# Patient Record
Sex: FEMALE | Race: WHITE | NOT HISPANIC OR LATINO | Employment: UNEMPLOYED | URBAN - METROPOLITAN AREA
[De-identification: names, ages, dates, MRNs, and addresses within clinical notes are randomized per-mention and may not be internally consistent; named-entity substitution may affect disease eponyms.]

---

## 2022-06-04 ENCOUNTER — APPOINTMENT (OUTPATIENT)
Dept: ULTRASOUND IMAGING | Facility: CLINIC | Age: 37
End: 2022-06-04
Attending: EMERGENCY MEDICINE
Payer: COMMERCIAL

## 2022-06-04 ENCOUNTER — HOSPITAL ENCOUNTER (EMERGENCY)
Facility: CLINIC | Age: 37
Discharge: HOME | End: 2022-06-04
Attending: EMERGENCY MEDICINE | Admitting: EMERGENCY MEDICINE
Payer: COMMERCIAL

## 2022-06-04 VITALS
SYSTOLIC BLOOD PRESSURE: 142 MMHG | OXYGEN SATURATION: 100 % | TEMPERATURE: 98.7 F | DIASTOLIC BLOOD PRESSURE: 79 MMHG | BODY MASS INDEX: 30.41 KG/M2 | WEIGHT: 200 LBS | HEART RATE: 97 BPM | RESPIRATION RATE: 18 BRPM

## 2022-06-04 DIAGNOSIS — R11.2 NAUSEA AND VOMITING, INTRACTABILITY OF VOMITING NOT SPECIFIED, UNSPECIFIED VOMITING TYPE: ICD-10-CM

## 2022-06-04 DIAGNOSIS — D72.829 LEUKOCYTOSIS, UNSPECIFIED TYPE: ICD-10-CM

## 2022-06-04 DIAGNOSIS — R11.2 CANNABINOID HYPEREMESIS SYNDROME: ICD-10-CM

## 2022-06-04 DIAGNOSIS — N93.9 VAGINAL BLEEDING: ICD-10-CM

## 2022-06-04 DIAGNOSIS — F12.90 CANNABINOID HYPEREMESIS SYNDROME: ICD-10-CM

## 2022-06-04 LAB
ALBUMIN SERPL-MCNC: 4.4 G/DL (ref 3.5–5)
ALBUMIN UR-MCNC: 70 MG/DL
ALP SERPL-CCNC: 70 U/L (ref 45–120)
ALT SERPL W P-5'-P-CCNC: 11 U/L (ref 0–45)
ANION GAP SERPL CALCULATED.3IONS-SCNC: 12 MMOL/L (ref 5–18)
APPEARANCE UR: ABNORMAL
AST SERPL W P-5'-P-CCNC: 12 U/L (ref 0–40)
BACTERIA #/AREA URNS HPF: ABNORMAL /HPF
BASOPHILS # BLD MANUAL: 0 10E3/UL (ref 0–0.2)
BASOPHILS NFR BLD MANUAL: 0 %
BILIRUB SERPL-MCNC: 0.7 MG/DL (ref 0–1)
BILIRUB UR QL STRIP: NEGATIVE
BUN SERPL-MCNC: 11 MG/DL (ref 8–22)
C REACTIVE PROTEIN LHE: 0.2 MG/DL (ref 0–0.8)
CALCIUM SERPL-MCNC: 9.7 MG/DL (ref 8.5–10.5)
CHLORIDE BLD-SCNC: 109 MMOL/L (ref 98–107)
CO2 SERPL-SCNC: 21 MMOL/L (ref 22–31)
COLOR UR AUTO: YELLOW
CREAT SERPL-MCNC: 0.95 MG/DL (ref 0.6–1.1)
EOSINOPHIL # BLD MANUAL: 0 10E3/UL (ref 0–0.7)
EOSINOPHIL NFR BLD MANUAL: 0 %
ERYTHROCYTE [DISTWIDTH] IN BLOOD BY AUTOMATED COUNT: 17.7 % (ref 10–15)
GFR SERPL CREATININE-BSD FRML MDRD: 79 ML/MIN/1.73M2
GLUCOSE BLD-MCNC: 185 MG/DL (ref 70–125)
GLUCOSE UR STRIP-MCNC: NEGATIVE MG/DL
HCG SERPL QL: NEGATIVE
HCT VFR BLD AUTO: 34.9 % (ref 35–47)
HGB BLD-MCNC: 10.5 G/DL (ref 11.7–15.7)
HGB UR QL STRIP: ABNORMAL
KETONES UR STRIP-MCNC: 80 MG/DL
LEUKOCYTE ESTERASE UR QL STRIP: NEGATIVE
LYMPHOCYTES # BLD MANUAL: 0.6 10E3/UL (ref 0.8–5.3)
LYMPHOCYTES NFR BLD MANUAL: 3 %
MCH RBC QN AUTO: 22.7 PG (ref 26.5–33)
MCHC RBC AUTO-ENTMCNC: 30.1 G/DL (ref 31.5–36.5)
MCV RBC AUTO: 76 FL (ref 78–100)
MONOCYTES # BLD MANUAL: 0.6 10E3/UL (ref 0–1.3)
MONOCYTES NFR BLD MANUAL: 3 %
MUCOUS THREADS #/AREA URNS LPF: PRESENT /LPF
NEUTROPHILS # BLD MANUAL: 19 10E3/UL (ref 1.6–8.3)
NEUTROPHILS NFR BLD MANUAL: 94 %
NITRATE UR QL: NEGATIVE
PH UR STRIP: 6 [PH] (ref 5–7)
PLAT MORPH BLD: ABNORMAL
PLATELET # BLD AUTO: 433 10E3/UL (ref 150–450)
POTASSIUM BLD-SCNC: 4.4 MMOL/L (ref 3.5–5)
PROT SERPL-MCNC: 7.9 G/DL (ref 6–8)
RBC # BLD AUTO: 4.62 10E6/UL (ref 3.8–5.2)
RBC MORPH BLD: ABNORMAL
RBC URINE: 3 /HPF
SODIUM SERPL-SCNC: 142 MMOL/L (ref 136–145)
SP GR UR STRIP: 1.04 (ref 1–1.03)
SQUAMOUS EPITHELIAL: 2 /HPF
UROBILINOGEN UR STRIP-MCNC: 3 MG/DL
WBC # BLD AUTO: 20.2 10E3/UL (ref 4–11)
WBC URINE: 3 /HPF

## 2022-06-04 PROCEDURE — 86140 C-REACTIVE PROTEIN: CPT | Performed by: EMERGENCY MEDICINE

## 2022-06-04 PROCEDURE — 85007 BL SMEAR W/DIFF WBC COUNT: CPT | Performed by: EMERGENCY MEDICINE

## 2022-06-04 PROCEDURE — 81001 URINALYSIS AUTO W/SCOPE: CPT | Performed by: EMERGENCY MEDICINE

## 2022-06-04 PROCEDURE — 96374 THER/PROPH/DIAG INJ IV PUSH: CPT

## 2022-06-04 PROCEDURE — 96361 HYDRATE IV INFUSION ADD-ON: CPT

## 2022-06-04 PROCEDURE — 84703 CHORIONIC GONADOTROPIN ASSAY: CPT | Performed by: EMERGENCY MEDICINE

## 2022-06-04 PROCEDURE — 76830 TRANSVAGINAL US NON-OB: CPT

## 2022-06-04 PROCEDURE — 80053 COMPREHEN METABOLIC PANEL: CPT | Performed by: EMERGENCY MEDICINE

## 2022-06-04 PROCEDURE — 96375 TX/PRO/DX INJ NEW DRUG ADDON: CPT

## 2022-06-04 PROCEDURE — 250N000011 HC RX IP 250 OP 636: Performed by: EMERGENCY MEDICINE

## 2022-06-04 PROCEDURE — 258N000003 HC RX IP 258 OP 636: Performed by: EMERGENCY MEDICINE

## 2022-06-04 PROCEDURE — 85027 COMPLETE CBC AUTOMATED: CPT | Performed by: EMERGENCY MEDICINE

## 2022-06-04 PROCEDURE — 36415 COLL VENOUS BLD VENIPUNCTURE: CPT | Performed by: EMERGENCY MEDICINE

## 2022-06-04 PROCEDURE — 99285 EMERGENCY DEPT VISIT HI MDM: CPT | Mod: 25

## 2022-06-04 RX ORDER — ONDANSETRON 2 MG/ML
4 INJECTION INTRAMUSCULAR; INTRAVENOUS ONCE
Status: COMPLETED | OUTPATIENT
Start: 2022-06-04 | End: 2022-06-04

## 2022-06-04 RX ORDER — ONDANSETRON 4 MG/1
4 TABLET, ORALLY DISINTEGRATING ORAL EVERY 6 HOURS PRN
Qty: 10 TABLET | Refills: 0 | Status: SHIPPED | OUTPATIENT
Start: 2022-06-04 | End: 2022-06-07

## 2022-06-04 RX ORDER — HALOPERIDOL 5 MG/ML
2 INJECTION INTRAMUSCULAR ONCE
Status: COMPLETED | OUTPATIENT
Start: 2022-06-04 | End: 2022-06-04

## 2022-06-04 RX ORDER — DIPHENHYDRAMINE HYDROCHLORIDE 50 MG/ML
25 INJECTION INTRAMUSCULAR; INTRAVENOUS ONCE
Status: COMPLETED | OUTPATIENT
Start: 2022-06-04 | End: 2022-06-04

## 2022-06-04 RX ORDER — KETOROLAC TROMETHAMINE 15 MG/ML
15 INJECTION, SOLUTION INTRAMUSCULAR; INTRAVENOUS ONCE
Status: COMPLETED | OUTPATIENT
Start: 2022-06-04 | End: 2022-06-04

## 2022-06-04 RX ADMIN — ONDANSETRON 4 MG: 2 INJECTION INTRAMUSCULAR; INTRAVENOUS at 20:49

## 2022-06-04 RX ADMIN — HALOPERIDOL LACTATE 2 MG: 5 INJECTION, SOLUTION INTRAMUSCULAR at 20:52

## 2022-06-04 RX ADMIN — SODIUM CHLORIDE 1000 ML: 9 INJECTION, SOLUTION INTRAVENOUS at 20:45

## 2022-06-04 RX ADMIN — DIPHENHYDRAMINE HYDROCHLORIDE 25 MG: 50 INJECTION, SOLUTION INTRAMUSCULAR; INTRAVENOUS at 20:47

## 2022-06-04 RX ADMIN — KETOROLAC TROMETHAMINE 15 MG: 15 INJECTION, SOLUTION INTRAMUSCULAR; INTRAVENOUS at 20:46

## 2022-06-04 ASSESSMENT — ENCOUNTER SYMPTOMS
FEVER: 0
COUGH: 0
HEADACHES: 0
DIARRHEA: 0
RHINORRHEA: 0
ABDOMINAL PAIN: 1
DYSURIA: 0
VOMITING: 1
NAUSEA: 1
SHORTNESS OF BREATH: 0
DIFFICULTY URINATING: 0
BACK PAIN: 1
CHILLS: 0
HEMATURIA: 0
SORE THROAT: 0
FREQUENCY: 0

## 2022-06-05 NOTE — ED TRIAGE NOTES
Patient reports that her period began this morning, she has had heavier bleeding than normal today, she began vomiting this morning, having bad menstrual cramps, denies diarrhea, denies changes in urination.  Amina Farrell RN.......6/4/2022 7:50 PM     Triage Assessment     Row Name 06/04/22 1948       Triage Assessment (Adult)    Airway WDL WDL       Respiratory WDL    Respiratory WDL WDL       Skin Circulation/Temperature WDL    Skin Circulation/Temperature WDL WDL       Cardiac WDL    Cardiac WDL WDL       Peripheral/Neurovascular WDL    Peripheral Neurovascular WDL WDL       Cognitive/Neuro/Behavioral WDL    Cognitive/Neuro/Behavioral WDL WDL

## 2022-06-05 NOTE — ED NOTES
Patient advised that her boyfriend will come pick her up. Advised she cannot drive after taking this medication. Verbalized understanding

## 2022-06-05 NOTE — ED PROVIDER NOTES
Emergency Department Encounter      NAME: Sheridan Hollingsworth  AGE: 36 year old female  YOB: 1985  MRN: [id number]  EVALUATION DATE & TIME: 2022  7:51 PM    PCP: No primary care provider on file.    ED PROVIDER: Jeovanny Croft M.D.      Chief Complaint   Patient presents with     Emesis     Abdominal Pain         FINAL IMPRESSION:  1. Cannabinoid hyperemesis syndrome    2. Nausea and vomiting, intractability of vomiting not specified, unspecified vomiting type    3. Leukocytosis, unspecified type    4. Vaginal bleeding        MEDICAL DECISION MAKIN:03 PM I met with the patient, obtained history, performed an initial exam, and discussed options and plan for diagnostics and treatment here in the ED.   10:43 PM I rechecked and updated the patient with results and plan for discharge.     This patient is a 36-year-old female who presents with vomiting and abdominal pain.  The patient states that she uses marijuana.  She says that she has been having suprapubic abdominal pain today.  She has been nauseous and vomited 10 times today.  The pain is crampy constant and radiates to her lower back.  She does say that it feels similar to her menstrual cramps but is way more severe and this is the first day of her menses.  She says that the vaginal bleeding is heavier today than normal and she has used 10 tampons today.  She has not had any other concerning symptoms.  The patient's lab work showed a slightly elevated white blood cell count which was left shifted.  A pelvic ultrasound was done and did not show any significant abnormality.  She had received antiemetics and Toradol for pain.  Because of her marijuana use and symptoms which sounded consistent with cannabinoid induced hyperemesis I gave her a dose of Haldol and Benadryl.  This worked well and she was no longer nauseous upon discharge.  Pertinent Labs & Imaging studies reviewed. (See chart for details)    The importance of close follow up was  discussed. We reviewed warning signs and symptoms, and I instructed Ms. Hollingsworth to return to the emergency department immediately if she develops any new or worsening symptoms. I provided additional verbal discharge instructions. Ms. Hollingsworth expressed understanding and agreement with this plan of care, her questions were answered, and she was discharged in stable condition.       MEDICATIONS GIVEN IN THE EMERGENCY:  Medications   0.9% sodium chloride BOLUS (0 mLs Intravenous Stopped 6/4/22 2236)   ondansetron (ZOFRAN) injection 4 mg (4 mg Intravenous Given 6/4/22 2049)   ketorolac (TORADOL) injection 15 mg (15 mg Intravenous Given 6/4/22 2046)   haloperidol lactate (HALDOL) injection 2 mg (2 mg Intravenous Given 6/4/22 2052)   diphenhydrAMINE (BENADRYL) injection 25 mg (25 mg Intravenous Given 6/4/22 2047)       NEW PRESCRIPTIONS STARTED AT TODAY'S ER VISIT:  Discharge Medication List as of 6/4/2022 11:12 PM      START taking these medications    Details   ondansetron (ZOFRAN ODT) 4 MG ODT tab Take 1 tablet (4 mg) by mouth every 6 hours as needed for nausea, Disp-10 tablet, R-0, Local Print                =================================================================    HPI    Patient information was obtained from: patient     Use of : N/A       Sheridan Hollingsworth is a 36 year old female with a past medical history of GERD and s/p cholecystectomy, who presents for evaluation of abdominal pain, nausea and vomiting.     Patient reports suprapubic abdominal pain and nausea with more than 10 episodes of vomiting today. Symptoms started this morning. Her abdominal pain is constant, cramping in nature, and radiates into her lower back. Has been taking tylenol without adequate relief. Stretching improves her spasm-like back pain. She has experienced this before and thinks it may be related to her menstrual cycle. Today is the first day of her cycle. She reports heavier vaginal bleeding than baseline. She has used more  than 10 tampons today. Of note, patient reports occasional marijuana use. She sometimes uses Capsicin cream for her associating symptoms.     Patient denies urinary symptoms, fever, chills, diarrhea, cough, shortness of breath, runny nose, congestion, headache, sore throat, leg swelling, calf pain, vaginal discharge. Reports a history of cholecystectomy ~15 years ago. She still has her appendix. Denies chance of pregnancy. Does not take any daily medications. Reports a history of low back surgery. No other complaints or concerns expressed at this time.      REVIEW OF SYSTEMS   Review of Systems   Constitutional: Negative for chills and fever.   HENT: Negative for congestion, rhinorrhea and sore throat.    Respiratory: Negative for cough and shortness of breath.    Cardiovascular: Negative for leg swelling.   Gastrointestinal: Positive for abdominal pain, nausea and vomiting. Negative for diarrhea.   Genitourinary: Positive for vaginal bleeding. Negative for difficulty urinating, dysuria, frequency, hematuria and vaginal discharge.   Musculoskeletal: Positive for back pain.        Negative for calf pain.   Neurological: Negative for headaches.   All other systems reviewed and are negative.       PAST MEDICAL HISTORY:  Past Medical History:   Diagnosis Date     Gastroesophageal reflux disease        PAST SURGICAL HISTORY:  Past Surgical History:   Procedure Laterality Date     CHOLECYSTECTOMY         CURRENT MEDICATIONS:    No current facility-administered medications for this encounter.    Current Outpatient Medications:      ondansetron (ZOFRAN ODT) 4 MG ODT tab, Take 1 tablet (4 mg) by mouth every 6 hours as needed for nausea, Disp: 10 tablet, Rfl: 0     cyclobenzaprine (FLEXERIL) 10 MG tablet, Take 10 mg by mouth 3 times daily as needed., Disp: , Rfl:      HYDROcodone-acetaminophen 5-325 MG per tablet, Take 1 tablet by mouth every 6 hours as needed for pain., Disp: 15 tablet, Rfl: 0     ibuprofen (ADVIL,MOTRIN)  600 MG tablet, Take 1 tablet by mouth every 6 hours as needed., Disp: , Rfl:      tobramycin (TOBREX) 0.3 % ophthalmic solution, Apply 1 drop to eye every 4 hours for 7 days., Disp: 1 Bottle, Rfl: 0    ALLERGIES:  Allergies   Allergen Reactions     Nkda [No Known Drug Allergies]        FAMILY HISTORY:  History reviewed. No pertinent family history.    SOCIAL HISTORY:   Social History     Socioeconomic History     Marital status: Single     Spouse name: None     Number of children: None     Years of education: None     Highest education level: None   Tobacco Use     Smoking status: Current Some Day Smoker     Types: Cigarettes     Smokeless tobacco: Current User   Substance and Sexual Activity     Drug use: Yes     Types: Marijuana       PHYSICAL EXAM:    Vitals: BP (!) 142/79   Pulse 97   Temp 98.7  F (37.1  C) (Oral)   Resp 18   Wt 90.7 kg (200 lb)   LMP 06/04/2022   SpO2 100%   BMI 30.41 kg/m     Constitutional: Well developed, well nourished. Comfortable appearing.  HEAD:Normocephalic, atraumatic,   Eyes: PERRLA, EOM intact, conjunctiva clear, no discharge  ENT: mucous membranes dry, nose normal.   Neck- Supple, gross ROM intact.  No JVD.  No palpable nodes.  Pulmonary: Clear to auscultation bilaterally, no respiratory distress, no wheezing, speaks full sentences easily.  Chest: No chest wall tenderness  Cardiovascular: Normal heart rate, regular rhythm, no murmurs. No lower extremity edema, 2+ DP pulses.   GI: Soft,Mild suprapubic abdominal tenderness, no tenderness over McBurney's point, not distended, no masses.  No hepatosplenomegaly.  Musculoskeletal: Moving all 4 extremities intentionally and without pain. No obvious deformity.  Back: No CVA tenderness  Skin: Warm, dry, no rash.  Neurologic: Alert & oriented x 3, speech clear, moving all extremities spontaneously   Psychiatric: Affect normal, cooperative.      LAB:  All pertinent labs reviewed and interpreted.  Labs Ordered and Resulted from Time of  ED Arrival to Time of ED Departure   COMPREHENSIVE METABOLIC PANEL - Abnormal       Result Value    Sodium 142      Potassium 4.4      Chloride 109 (*)     Carbon Dioxide (CO2) 21 (*)     Anion Gap 12      Urea Nitrogen 11      Creatinine 0.95      Calcium 9.7      Glucose 185 (*)     Alkaline Phosphatase 70      AST 12      ALT 11      Protein Total 7.9      Albumin 4.4      Bilirubin Total 0.7      GFR Estimate 79     ROUTINE UA WITH MICROSCOPIC REFLEX TO CULTURE - Abnormal    Color Urine Yellow      Appearance Urine Turbid (*)     Glucose Urine Negative      Bilirubin Urine Negative      Ketones Urine 80  (*)     Specific Gravity Urine 1.038 (*)     Blood Urine 0.2 mg/dL (*)     pH Urine 6.0      Protein Albumin Urine 70  (*)     Urobilinogen Urine 3.0 (*)     Nitrite Urine Negative      Leukocyte Esterase Urine Negative      Bacteria Urine Few (*)     Mucus Urine Present (*)     RBC Urine 3 (*)     WBC Urine 3      Squamous Epithelials Urine 2 (*)    CBC WITH PLATELETS AND DIFFERENTIAL - Abnormal    WBC Count 20.2 (*)     RBC Count 4.62      Hemoglobin 10.5 (*)     Hematocrit 34.9 (*)     MCV 76 (*)     MCH 22.7 (*)     MCHC 30.1 (*)     RDW 17.7 (*)     Platelet Count 433     DIFFERENTIAL - Abnormal    % Neutrophils 94      % Lymphocytes 3      % Monocytes 3      % Eosinophils 0      % Basophils 0      Absolute Neutrophils 19.0 (*)     Absolute Lymphocytes 0.6 (*)     Absolute Monocytes 0.6      Absolute Eosinophils 0.0      Absolute Basophils 0.0      RBC Morphology Confirmed RBC Indices      Platelet Assessment        Value: Automated Count Confirmed. Platelet morphology is normal.   HCG QUALITATIVE PREGNANCY - Normal    hCG Serum Qualitative Negative     CRP INFLAMMATION - Normal    CRP 0.2         RADIOLOGY:  US Pelvic Complete w Transvaginal   Final Result   IMPRESSION:   No significant abnormality.                   PROCEDURES:   Procedures       I, Demetrius Alaniz, am serving as a scribe to document  services personally performed by Dr. Jeovanny Croft based on my observation and the provider's statements to me. I, Jeovanny Croft M.D. attest that Demetrius Alaniz is acting in a scribe capacity, has observed my performance of the services and has documented them in accordance with my direction.      Jeovanny Croft M.D.  Emergency Medicine  [facility] EMERGENCY ROOM  [address] [address]  [phone]  Dept: [phone]     Jeovanny Croft MD  07/08/22 2021

## 2022-06-05 NOTE — DISCHARGE INSTRUCTIONS
Here gynecologist may want to have you on oral birth control pills to regulate your bleeding.    Stop using marijuana    Continue the hot baths and capsaicin cream as needed    Return if worse, fevers, intractable vomiting or worse pain.

## 2023-02-10 ENCOUNTER — HOSPITAL ENCOUNTER (INPATIENT)
Facility: CLINIC | Age: 38
LOS: 1 days | Discharge: HOME | End: 2023-02-11
Attending: EMERGENCY MEDICINE | Admitting: FAMILY MEDICINE
Payer: COMMERCIAL

## 2023-02-10 ENCOUNTER — APPOINTMENT (OUTPATIENT)
Dept: ULTRASOUND IMAGING | Facility: CLINIC | Age: 38
End: 2023-02-10
Attending: EMERGENCY MEDICINE
Payer: COMMERCIAL

## 2023-02-10 DIAGNOSIS — O21.0 HYPEREMESIS GRAVIDARUM: ICD-10-CM

## 2023-02-10 DIAGNOSIS — O00.102 LEFT TUBAL PREGNANCY WITHOUT INTRAUTERINE PREGNANCY: ICD-10-CM

## 2023-02-10 LAB
ABO/RH(D): NORMAL
ALBUMIN SERPL-MCNC: 4.6 G/DL (ref 3.5–5)
ALBUMIN UR-MCNC: 50 MG/DL
ALP SERPL-CCNC: 72 U/L (ref 45–120)
ALT SERPL W P-5'-P-CCNC: 14 U/L (ref 0–45)
ANION GAP SERPL CALCULATED.3IONS-SCNC: 14 MMOL/L (ref 5–18)
ANTIBODY SCREEN: NEGATIVE
APPEARANCE UR: CLEAR
AST SERPL W P-5'-P-CCNC: 13 U/L (ref 0–40)
BASOPHILS # BLD AUTO: 0.1 10E3/UL (ref 0–0.2)
BASOPHILS NFR BLD AUTO: 0 %
BILIRUB SERPL-MCNC: 0.6 MG/DL (ref 0–1)
BILIRUB UR QL STRIP: NEGATIVE
BUN SERPL-MCNC: 12 MG/DL (ref 8–22)
CALCIUM SERPL-MCNC: 10.2 MG/DL (ref 8.5–10.5)
CHLORIDE BLD-SCNC: 109 MMOL/L (ref 98–107)
CO2 SERPL-SCNC: 18 MMOL/L (ref 22–31)
COLOR UR AUTO: YELLOW
CREAT SERPL-MCNC: 1.03 MG/DL (ref 0.6–1.1)
EOSINOPHIL # BLD AUTO: 0 10E3/UL (ref 0–0.7)
EOSINOPHIL NFR BLD AUTO: 0 %
ERYTHROCYTE [DISTWIDTH] IN BLOOD BY AUTOMATED COUNT: 17.8 % (ref 10–15)
GFR SERPL CREATININE-BSD FRML MDRD: 71 ML/MIN/1.73M2
GLUCOSE BLD-MCNC: 200 MG/DL (ref 70–125)
GLUCOSE UR STRIP-MCNC: 70 MG/DL
HCG SERPL QL: POSITIVE
HCG SERPL-ACNC: ABNORMAL MLU/ML (ref 0–4)
HCT VFR BLD AUTO: 34.4 % (ref 35–47)
HGB BLD-MCNC: 10.4 G/DL (ref 11.7–15.7)
HGB UR QL STRIP: ABNORMAL
IMM GRANULOCYTES # BLD: 0.1 10E3/UL
IMM GRANULOCYTES NFR BLD: 1 %
KETONES UR STRIP-MCNC: 100 MG/DL
LEUKOCYTE ESTERASE UR QL STRIP: NEGATIVE
LIPASE SERPL-CCNC: 23 U/L (ref 0–52)
LYMPHOCYTES # BLD AUTO: 1.1 10E3/UL (ref 0.8–5.3)
LYMPHOCYTES NFR BLD AUTO: 5 %
MCH RBC QN AUTO: 22.7 PG (ref 26.5–33)
MCHC RBC AUTO-ENTMCNC: 30.2 G/DL (ref 31.5–36.5)
MCV RBC AUTO: 75 FL (ref 78–100)
MONOCYTES # BLD AUTO: 0.8 10E3/UL (ref 0–1.3)
MONOCYTES NFR BLD AUTO: 4 %
MUCOUS THREADS #/AREA URNS LPF: PRESENT /LPF
NEUTROPHILS # BLD AUTO: 19.1 10E3/UL (ref 1.6–8.3)
NEUTROPHILS NFR BLD AUTO: 90 %
NITRATE UR QL: NEGATIVE
NRBC # BLD AUTO: 0 10E3/UL
NRBC BLD AUTO-RTO: 0 /100
PH UR STRIP: 5.5 [PH] (ref 5–7)
PLATELET # BLD AUTO: 546 10E3/UL (ref 150–450)
POTASSIUM BLD-SCNC: 4.2 MMOL/L (ref 3.5–5)
PROT SERPL-MCNC: 8.5 G/DL (ref 6–8)
RADIOLOGIST FLAGS: ABNORMAL
RBC # BLD AUTO: 4.58 10E6/UL (ref 3.8–5.2)
RBC URINE: 1 /HPF
SARS-COV-2 RNA RESP QL NAA+PROBE: NEGATIVE
SODIUM SERPL-SCNC: 141 MMOL/L (ref 136–145)
SP GR UR STRIP: 1.03 (ref 1–1.03)
SPECIMEN EXPIRATION DATE: NORMAL
SQUAMOUS EPITHELIAL: 4 /HPF
UROBILINOGEN UR STRIP-MCNC: <2 MG/DL
WBC # BLD AUTO: 21.2 10E3/UL (ref 4–11)
WBC URINE: 2 /HPF

## 2023-02-10 PROCEDURE — 84702 CHORIONIC GONADOTROPIN TEST: CPT | Performed by: EMERGENCY MEDICINE

## 2023-02-10 PROCEDURE — 80053 COMPREHEN METABOLIC PANEL: CPT | Performed by: EMERGENCY MEDICINE

## 2023-02-10 PROCEDURE — 250N000011 HC RX IP 250 OP 636: Performed by: STUDENT IN AN ORGANIZED HEALTH CARE EDUCATION/TRAINING PROGRAM

## 2023-02-10 PROCEDURE — 96374 THER/PROPH/DIAG INJ IV PUSH: CPT

## 2023-02-10 PROCEDURE — 81001 URINALYSIS AUTO W/SCOPE: CPT | Performed by: EMERGENCY MEDICINE

## 2023-02-10 PROCEDURE — 258N000003 HC RX IP 258 OP 636: Performed by: STUDENT IN AN ORGANIZED HEALTH CARE EDUCATION/TRAINING PROGRAM

## 2023-02-10 PROCEDURE — 96375 TX/PRO/DX INJ NEW DRUG ADDON: CPT

## 2023-02-10 PROCEDURE — 36415 COLL VENOUS BLD VENIPUNCTURE: CPT | Performed by: EMERGENCY MEDICINE

## 2023-02-10 PROCEDURE — 83690 ASSAY OF LIPASE: CPT | Performed by: EMERGENCY MEDICINE

## 2023-02-10 PROCEDURE — 99285 EMERGENCY DEPT VISIT HI MDM: CPT | Mod: 25

## 2023-02-10 PROCEDURE — C9803 HOPD COVID-19 SPEC COLLECT: HCPCS

## 2023-02-10 PROCEDURE — 84703 CHORIONIC GONADOTROPIN ASSAY: CPT | Performed by: EMERGENCY MEDICINE

## 2023-02-10 PROCEDURE — 250N000011 HC RX IP 250 OP 636: Performed by: OBSTETRICS & GYNECOLOGY

## 2023-02-10 PROCEDURE — 85025 COMPLETE CBC W/AUTO DIFF WBC: CPT | Performed by: EMERGENCY MEDICINE

## 2023-02-10 PROCEDURE — 96361 HYDRATE IV INFUSION ADD-ON: CPT

## 2023-02-10 PROCEDURE — 258N000003 HC RX IP 258 OP 636: Performed by: EMERGENCY MEDICINE

## 2023-02-10 PROCEDURE — 86901 BLOOD TYPING SEROLOGIC RH(D): CPT | Performed by: EMERGENCY MEDICINE

## 2023-02-10 PROCEDURE — 76801 OB US < 14 WKS SINGLE FETUS: CPT

## 2023-02-10 PROCEDURE — 250N000011 HC RX IP 250 OP 636: Performed by: EMERGENCY MEDICINE

## 2023-02-10 PROCEDURE — 86850 RBC ANTIBODY SCREEN: CPT | Performed by: EMERGENCY MEDICINE

## 2023-02-10 PROCEDURE — 120N000001 HC R&B MED SURG/OB

## 2023-02-10 PROCEDURE — U0003 INFECTIOUS AGENT DETECTION BY NUCLEIC ACID (DNA OR RNA); SEVERE ACUTE RESPIRATORY SYNDROME CORONAVIRUS 2 (SARS-COV-2) (CORONAVIRUS DISEASE [COVID-19]), AMPLIFIED PROBE TECHNIQUE, MAKING USE OF HIGH THROUGHPUT TECHNOLOGIES AS DESCRIBED BY CMS-2020-01-R: HCPCS | Performed by: EMERGENCY MEDICINE

## 2023-02-10 RX ORDER — SODIUM CHLORIDE 9 MG/ML
INJECTION, SOLUTION INTRAVENOUS CONTINUOUS
Status: DISCONTINUED | OUTPATIENT
Start: 2023-02-10 | End: 2023-02-11 | Stop reason: HOSPADM

## 2023-02-10 RX ORDER — HALOPERIDOL 5 MG/ML
2 INJECTION INTRAMUSCULAR ONCE
Status: COMPLETED | OUTPATIENT
Start: 2023-02-10 | End: 2023-02-10

## 2023-02-10 RX ORDER — ONDANSETRON 2 MG/ML
4 INJECTION INTRAMUSCULAR; INTRAVENOUS EVERY 6 HOURS PRN
Status: DISCONTINUED | OUTPATIENT
Start: 2023-02-10 | End: 2023-02-11 | Stop reason: HOSPADM

## 2023-02-10 RX ORDER — LIDOCAINE 40 MG/G
CREAM TOPICAL
Status: DISCONTINUED | OUTPATIENT
Start: 2023-02-10 | End: 2023-02-11 | Stop reason: HOSPADM

## 2023-02-10 RX ORDER — HYDROMORPHONE HYDROCHLORIDE 1 MG/ML
0.2 INJECTION, SOLUTION INTRAMUSCULAR; INTRAVENOUS; SUBCUTANEOUS
Status: DISCONTINUED | OUTPATIENT
Start: 2023-02-10 | End: 2023-02-11 | Stop reason: HOSPADM

## 2023-02-10 RX ORDER — PROCHLORPERAZINE 25 MG
25 SUPPOSITORY, RECTAL RECTAL EVERY 12 HOURS PRN
Status: DISCONTINUED | OUTPATIENT
Start: 2023-02-10 | End: 2023-02-11 | Stop reason: HOSPADM

## 2023-02-10 RX ORDER — PROCHLORPERAZINE MALEATE 10 MG
10 TABLET ORAL EVERY 6 HOURS PRN
Qty: 30 TABLET | Refills: 0 | Status: SHIPPED | OUTPATIENT
Start: 2023-02-10

## 2023-02-10 RX ORDER — PROCHLORPERAZINE MALEATE 10 MG
10 TABLET ORAL EVERY 6 HOURS PRN
Status: DISCONTINUED | OUTPATIENT
Start: 2023-02-10 | End: 2023-02-11 | Stop reason: HOSPADM

## 2023-02-10 RX ORDER — ONDANSETRON 2 MG/ML
4 INJECTION INTRAMUSCULAR; INTRAVENOUS ONCE
Status: COMPLETED | OUTPATIENT
Start: 2023-02-10 | End: 2023-02-10

## 2023-02-10 RX ORDER — ONDANSETRON 4 MG/1
4 TABLET, ORALLY DISINTEGRATING ORAL EVERY 8 HOURS PRN
Qty: 20 TABLET | Refills: 0 | Status: SHIPPED | OUTPATIENT
Start: 2023-02-10

## 2023-02-10 RX ORDER — ONDANSETRON 4 MG/1
4 TABLET, ORALLY DISINTEGRATING ORAL EVERY 6 HOURS PRN
Status: DISCONTINUED | OUTPATIENT
Start: 2023-02-10 | End: 2023-02-11 | Stop reason: HOSPADM

## 2023-02-10 RX ORDER — ONDANSETRON 2 MG/ML
4 INJECTION INTRAMUSCULAR; INTRAVENOUS ONCE
Status: DISCONTINUED | OUTPATIENT
Start: 2023-02-10 | End: 2023-02-10

## 2023-02-10 RX ORDER — METHOTREXATE 25 MG/ML
50 INJECTION INTRA-ARTERIAL; INTRAMUSCULAR; INTRATHECAL; INTRAVENOUS ONCE
Status: COMPLETED | OUTPATIENT
Start: 2023-02-10 | End: 2023-02-10

## 2023-02-10 RX ADMIN — METHOTREXATE 104 MG: 25 SOLUTION INTRA-ARTERIAL; INTRAMUSCULAR; INTRATHECAL; INTRAVENOUS at 22:17

## 2023-02-10 RX ADMIN — SODIUM CHLORIDE: 9 INJECTION, SOLUTION INTRAVENOUS at 22:40

## 2023-02-10 RX ADMIN — HALOPERIDOL LACTATE 2 MG: 5 INJECTION, SOLUTION INTRAMUSCULAR at 16:34

## 2023-02-10 RX ADMIN — ONDANSETRON 4 MG: 2 INJECTION INTRAMUSCULAR; INTRAVENOUS at 18:02

## 2023-02-10 RX ADMIN — SODIUM CHLORIDE 1000 ML: 9 INJECTION, SOLUTION INTRAVENOUS at 18:00

## 2023-02-10 RX ADMIN — FAMOTIDINE 20 MG: 10 INJECTION, SOLUTION INTRAVENOUS at 16:30

## 2023-02-10 RX ADMIN — HYDROMORPHONE HYDROCHLORIDE 0.2 MG: 1 INJECTION, SOLUTION INTRAMUSCULAR; INTRAVENOUS; SUBCUTANEOUS at 20:02

## 2023-02-10 RX ADMIN — FAMOTIDINE 20 MG: 10 INJECTION, SOLUTION INTRAVENOUS at 16:26

## 2023-02-10 RX ADMIN — HUMAN RHO(D) IMMUNE GLOBULIN 300 MCG: 1500 SOLUTION INTRAMUSCULAR; INTRAVENOUS at 21:59

## 2023-02-10 RX ADMIN — SODIUM CHLORIDE 1000 ML: 9 INJECTION, SOLUTION INTRAVENOUS at 16:26

## 2023-02-10 ASSESSMENT — ACTIVITIES OF DAILY LIVING (ADL)
ADLS_ACUITY_SCORE: 35

## 2023-02-10 ASSESSMENT — ENCOUNTER SYMPTOMS
VOMITING: 1
NAUSEA: 1
DIARRHEA: 0
ABDOMINAL PAIN: 1

## 2023-02-10 NOTE — ED PROVIDER NOTES
EMERGENCY DEPARTMENT ENCOUNTER      NAME: Sheridan Hollingsworth  AGE: 37 year old female  YOB: 1985  MRN: [id number]  EVALUATION DATE & TIME: 2/10/2023  4:12 PM    PCP: No Ref-Primary, Physician    ED PROVIDER: Armando Serrato MD        Chief Complaint   Patient presents with     Vomiting         FINAL IMPRESSION:  1. Left tubal pregnancy without intrauterine pregnancy    2. Hyperemesis gravidarum          ED COURSE & MEDICAL DECISION MAKING:    Pertinent Labs & Imaging studies reviewed. (See chart for details)  37 year old female presents to the Emergency Department for evaluation of vomiting, epigastric abdominal discomfort, throwing up blood    History of cyclic vomiting.  Reports she has not used marijuana in 1 month.  Patient is asking for antinausea medicines.  I discussed I would like to get a ECG prior to giving antinausea medicines the patient states the machine.  She would want most effective antinausea medicine for cyclic vomiting therefore haloperidol was ordered.    Plan for IV fluids, Pepcid, haloperidol for cyclic vomiting, BMP, CBC, lipase, UA, pregnancy test    Patient is continuing to vomit.  Patient tested back positive.  Zofran ordered.    Right upper quadrant ultrasound in order to evaluate for biliary colic and also ultrasound ordered to obtain dating for pregnancy    Patient does have mild acidosis however no anion gap acidosis    Patient is miserable appearing from the vomiting perspective.  Spoke with OB after ultrasound came back showing ectopic pregnancy with heart rates in left tubal.  OB recommends admission to medicine service based on vomiting, acidosis, and small mount of vomiting of blood    After haloperidol and Zofran symptoms are improving the patient still looks dehydrated    Rh- therefore RhoGAM ordered    OB came and evaluated patient and recommended methotrexate.    Patient's was in surge area bed and was requesting hospital bed with bath otherwise she will go home.    I  discussed the patient I recommend her going home with patient having active vomiting and dry heaving and looking uncomfortable    Bed was assigned.  Patient agrees to come into the hospital under the resident service for further management of her hyperemesis, dehydration, and ectopic pregnancy        ED Course as of 02/10/23 2256   Fri Feb 10, 2023   1724 WBC(!): 21.2  Will obtain right upper quadrant ultrasound, UA elevated white count is likely secondary to stress response from vomiting.  Patient is pregnant limiting imaging.  History and examination are not suggestive of appendicitis.   1736 Does not have her gallbladder therefore will not obtain retrocardial ultrasound.  I discussed this with radiology tech.  Patient reports she has had some spotting last couple days but no pelvic or lower abdominal pain.  With spotting and vomiting and positive pregnancy test will obtain OB ultrasound       2252 HCG Qualitative Serum(!): Positive    hCG Quantitative(!): 15,698   2252 ABO/Rh(D): O NEG  Rho shelley ordered    WBC(!): 21.2  Likely secondary to dehydration and stress.  Appendicitis, sepsis, PID, diverticulitis unlikely   2 SARS CoV2 PCR: Negative       4:22 PM I met with the patient, obtained history, performed an initial exam, and discussed options and plan for diagnostics and treatment here in the ED.   5:12 PM Patient vomiting.  5:18 PM Patient's pregnancy test came back positive.  6:23 PM I spoke with OB.  Patient's pregnancy is ectopic.  6:25 PM I rechecked and updated the patient.  6:35 PM Dr. Larios, OB, says to admit to medicine, they will be down soon.  6:49 PM I spoke with the resident hospitalist, Dr. Bearden.  9:39 PM I spoke with Dr. Larios, OB, says methotrexate works for people with ectopic pregnancies with a heart beat. Patient okay to go home.  She will be sent home with Favoean and told to call clinic Monday. OB gave the patient return instructions.  9:49 PM I spoke with the  resident. I spoke with the patient about going home and potential admission. I recommended admission.  955 PM: Patient actively vomiting and I recommend she come in the hospital for better management of her vomiting and since she appears dehydrated.  Agrees to come in the hospital, if she can have a hospital bed with a bath  2200: Resident updated on plan for admission hospital bed was found.  Per nursing there is bathtub        Medical Decision Making    History:    Supplemental history from: Documented in chart, if applicable    External Record(s) reviewed: Documented in chart, if applicable.    Work Up:    Chart documentation includes differential considered and any EKGs or imaging independently interpreted by provider, where specified.    In additional to work up documented, I considered the following work up: Documented in chart, if applicable.    External consultation:    Discussion of management with another provider: Documented in chart, if applicable    Complicating factors:    Care impacted by chronic illness: N/A    Care affected by social determinants of health: N/A    Disposition considerations: Admit.    The patient is critically ill and has required 50 minutes of critical care time exclusive of procedures. This includes time spent interviewing the patient, ordering tests and medications, monitoring vital signs, reviewing results, patient updates, discussing the case with family and consultants, and admission.        At the conclusion of the encounter I discussed the results of all of the tests and the disposition. The questions were answered. The patient or family acknowledged understanding and was agreeable with the care plan.         MEDICATIONS GIVEN IN THE EMERGENCY:  Medications   lidocaine 1 % 0.1-1 mL (has no administration in time range)   lidocaine (LMX4) cream (has no administration in time range)   sodium chloride (PF) 0.9% PF flush 3 mL (3 mLs Intracatheter Given 2/10/23 2204)   sodium  chloride (PF) 0.9% PF flush 3 mL (has no administration in time range)   melatonin tablet 1 mg (has no administration in time range)   sodium chloride 0.9% infusion ( Intravenous New Bag 2/10/23 2240)   HYDROmorphone (PF) (DILAUDID) injection 0.2 mg (0.2 mg Intravenous Given 2/10/23 2002)   ondansetron (ZOFRAN ODT) ODT tab 4 mg (has no administration in time range)     Or   ondansetron (ZOFRAN) injection 4 mg (has no administration in time range)   prochlorperazine (COMPAZINE) injection 10 mg (has no administration in time range)     Or   prochlorperazine (COMPAZINE) tablet 10 mg (has no administration in time range)     Or   prochlorperazine (COMPAZINE) suppository 25 mg (has no administration in time range)   0.9% sodium chloride BOLUS (1,000 mLs Intravenous New Bag 2/10/23 1626)   famotidine (PEPCID) injection 20 mg (20 mg Intravenous Given 2/10/23 1626)   haloperidol lactate (HALDOL) injection 2 mg (2 mg Intravenous Given 2/10/23 1634)   famotidine (PEPCID) injection 20 mg (20 mg Intravenous Given 2/10/23 1630)   0.9% sodium chloride BOLUS (1,000 mLs Intravenous New Bag 2/10/23 1800)   ondansetron (ZOFRAN) injection 4 mg (4 mg Intravenous Given 2/10/23 1802)   methotrexate sodium (pres-free) injection 104 mg (104 mg Intramuscular Given 2/10/23 2217)   rho(D) immune globulin (RHOPHYLAC) injection 300 mcg (300 mcg Intravenous Given 2/10/23 2159)     Or   rho(D) immune globulin (RHOPHYLAC) injection 300 mcg ( Intramuscular See Alternative 2/10/23 2159)       NEW PRESCRIPTIONS STARTED AT TODAY'S ER VISIT  Current Discharge Medication List      START taking these medications    Details   ondansetron (ZOFRAN ODT) 4 MG ODT tab Take 1 tablet (4 mg) by mouth every 8 hours as needed for nausea  Qty: 20 tablet, Refills: 0      prochlorperazine (COMPAZINE) 10 MG tablet Take 1 tablet (10 mg) by mouth every 6 hours as needed for nausea or vomiting  Qty: 30 tablet, Refills: 0             "    =================================================================    HPI    Triage note  \"  Arrives to ED with c/o N/V beginning at 0300. Emesis x10. Noted blood streaking. Denies diarrhea. Abd pain when vomiting. +chills. Has not used marijuana >1 month.      Triage Assessment     Row Name 02/10/23 1530       Triage Assessment (Adult)    Airway WDL WDL       Respiratory WDL    Respiratory WDL WDL       Skin Circulation/Temperature WDL    Skin Circulation/Temperature WDL WDL       Cardiac WDL    Cardiac WDL X;rhythm    Pulse Rate & Regularity tachycardic       Peripheral/Neurovascular WDL    Peripheral Neurovascular WDL WDL       Cognitive/Neuro/Behavioral WDL    Cognitive/Neuro/Behavioral WDL WDL              \"      Patient information was obtained from: Patient    Use of : N/A          Sheridan Hollingsworth is a 37 year old female with a pertinent history of GERD and s/p cholecystectomy, who presents to this ED via walk-in for evaluation of vomiting.    Patient reports vomiting since she woke up this morning at 0300, with x10 episodes of emesis today. She notes some blood in the vomit towards the end. No diarrhea. She endorses abdominal pain rated 6/10, worse when vomiting. She denies any recent travel. No history of stomach ulcers or liver issues.  She denies any alcohol use. Patient reports she quit smoking marijuana 2-3 months ago due to cyclical vomiting issues while using it. She endorses some occasional second hand marijuana. Patient reports she initially thought the vomiting was from her roast she at last night, but no one else is sick who ate it. No sick contacts at work. She notes taking capsaicin, which did not help. Patient is uncertain if pregnant, and has had some spotting but her periods are irregular. Patient denies any other current complaints.      REVIEW OF SYSTEMS   Review of Systems   Gastrointestinal: Positive for abdominal pain, nausea and vomiting (with blood). Negative for diarrhea. " "  Genitourinary: Positive for vaginal bleeding (spotting).        PAST MEDICAL HISTORY:  Past Medical History:   Diagnosis Date     Gastroesophageal reflux disease        PAST SURGICAL HISTORY:  Past Surgical History:   Procedure Laterality Date     CHOLECYSTECTOMY             CURRENT MEDICATIONS:    ondansetron (ZOFRAN ODT) 4 MG ODT tab  prochlorperazine (COMPAZINE) 10 MG tablet        ALLERGIES:  Allergies   Allergen Reactions     Nkda [No Known Drug Allergies]        FAMILY HISTORY:  No family history on file.    SOCIAL HISTORY:   Social History     Socioeconomic History     Marital status: Single   Tobacco Use     Smoking status: Some Days     Types: Cigarettes     Smokeless tobacco: Current   Substance and Sexual Activity     Drug use: Yes     Types: Marijuana       VITALS:  BP (!) 143/89   Pulse 110   Temp 97  F (36.1  C) (Temporal)   Resp 20   Ht 1.727 m (5' 8\")   Wt 90.3 kg (199 lb 1.6 oz)   LMP 02/05/2023   SpO2 98%   BMI 30.27 kg/m      PHYSICAL EXAM      Vitals: BP (!) 143/89   Pulse 110   Temp 97  F (36.1  C) (Temporal)   Resp 20   Ht 1.727 m (5' 8\")   Wt 90.3 kg (199 lb 1.6 oz)   LMP 02/05/2023   SpO2 98%   BMI 30.27 kg/m    General: Ill-appearing but nontoxic, awake, alert, interactive.  Eyes: Conjunctivae non-injected. Sclera anicteric.  HENT: Atraumatic.  Neck: Supple.  Respiratory/Chest: Respiration unlabored.  Heart: Regular rate and rhythm  Abdomen: non distended. Soft, non tender. No guarding.  No abdominal tenderness or guarding  Musculoskeletal: Normal extremities. No edema or erythema.  Skin: Normal color. No rash or diaphoresis.  Neurologic: Face symmetric, moves all extremities spontaneously. Speech clear.  Psychiatric: Oriented to person, place, and time. Affect appropriate.       LAB:  All pertinent labs reviewed and interpreted.  Results for orders placed or performed during the hospital encounter of 02/10/23   OB  US 1st trimester w transvag   Result Value Ref Range    " Radiologist flags Positive ectopic pregnancy (AA)     Impression    IMPRESSION:     1.  Single living left adnexal ectopic pregnancy at 6 weeks 3 days gestation.    2.  No significant free pelvic fluid or other signs of rupture.    [Critical Result: Positive ectopic pregnancy]    Finding was identified on 2/10/2023 6:11 PM.     Dr. Serrato was contacted by me on 2/10/2023 6:13 PM and verbalized understanding of the critical result.   Comprehensive metabolic panel   Result Value Ref Range    Sodium 141 136 - 145 mmol/L    Potassium 4.2 3.5 - 5.0 mmol/L    Chloride 109 (H) 98 - 107 mmol/L    Carbon Dioxide (CO2) 18 (L) 22 - 31 mmol/L    Anion Gap 14 5 - 18 mmol/L    Urea Nitrogen 12 8 - 22 mg/dL    Creatinine 1.03 0.60 - 1.10 mg/dL    Calcium 10.2 8.5 - 10.5 mg/dL    Glucose 200 (H) 70 - 125 mg/dL    Alkaline Phosphatase 72 45 - 120 U/L    AST 13 0 - 40 U/L    ALT 14 0 - 45 U/L    Protein Total 8.5 (H) 6.0 - 8.0 g/dL    Albumin 4.6 3.5 - 5.0 g/dL    Bilirubin Total 0.6 0.0 - 1.0 mg/dL    GFR Estimate 71 >60 mL/min/1.73m2   Result Value Ref Range    Lipase 23 0 - 52 U/L   HCG QUALitative pregnancy (blood)   Result Value Ref Range    hCG Serum Qualitative Positive (A) Negative   CBC with platelets and differential   Result Value Ref Range    WBC Count 21.2 (H) 4.0 - 11.0 10e3/uL    RBC Count 4.58 3.80 - 5.20 10e6/uL    Hemoglobin 10.4 (L) 11.7 - 15.7 g/dL    Hematocrit 34.4 (L) 35.0 - 47.0 %    MCV 75 (L) 78 - 100 fL    MCH 22.7 (L) 26.5 - 33.0 pg    MCHC 30.2 (L) 31.5 - 36.5 g/dL    RDW 17.8 (H) 10.0 - 15.0 %    Platelet Count 546 (H) 150 - 450 10e3/uL    % Neutrophils 90 %    % Lymphocytes 5 %    % Monocytes 4 %    % Eosinophils 0 %    % Basophils 0 %    % Immature Granulocytes 1 %    NRBCs per 100 WBC 0 <1 /100    Absolute Neutrophils 19.1 (H) 1.6 - 8.3 10e3/uL    Absolute Lymphocytes 1.1 0.8 - 5.3 10e3/uL    Absolute Monocytes 0.8 0.0 - 1.3 10e3/uL    Absolute Eosinophils 0.0 0.0 - 0.7 10e3/uL    Absolute Basophils 0.1  0.0 - 0.2 10e3/uL    Absolute Immature Granulocytes 0.1 <=0.4 10e3/uL    Absolute NRBCs 0.0 10e3/uL   HCG quantitative pregnancy (blood)   Result Value Ref Range    hCG Quantitative 15,698 (H) 0 - 4 mlU/mL   UA with Microscopic reflex to Culture    Specimen: Urine, Midstream   Result Value Ref Range    Color Urine Yellow Colorless, Straw, Light Yellow, Yellow    Appearance Urine Clear Clear    Glucose Urine 70 (A) Negative mg/dL    Bilirubin Urine Negative Negative    Ketones Urine 100 (A) Negative mg/dL    Specific Gravity Urine 1.034 (H) 1.001 - 1.030    Blood Urine 0.1 mg/dL (A) Negative    pH Urine 5.5 5.0 - 7.0    Protein Albumin Urine 50 (A) Negative mg/dL    Urobilinogen Urine <2.0 <2.0 mg/dL    Nitrite Urine Negative Negative    Leukocyte Esterase Urine Negative Negative    Mucus Urine Present (A) None Seen /LPF    RBC Urine 1 <=2 /HPF    WBC Urine 2 <=5 /HPF    Squamous Epithelials Urine 4 (H) <=1 /HPF   Asymptomatic COVID-19 Virus (Coronavirus) by PCR Nasopharyngeal    Specimen: Nasopharyngeal; Swab   Result Value Ref Range    SARS CoV2 PCR Negative Negative   Adult Type and Screen   Result Value Ref Range    ABO/RH(D) O NEG     Antibody Screen Negative Negative    SPECIMEN EXPIRATION DATE [id number]        RADIOLOGY:  Reviewed all pertinent imaging. Please see official radiology report.  OB  US 1st trimester w transvag   Final Result   Abnormal   IMPRESSION:       1.  Single living left adnexal ectopic pregnancy at 6 weeks 3 days gestation.      2.  No significant free pelvic fluid or other signs of rupture.      [Critical Result: Positive ectopic pregnancy]      Finding was identified on 2/10/2023 6:11 PM.       Dr. Serrato was contacted by me on 2/10/2023 6:13 PM and verbalized understanding of the critical result.              I, Annita Augustin, am serving as a scribe to document services personally performed by Isaac Serrato MD based on my observation and the provider's statements to me. I, Dr. Gray  Ama, attest that Annita Augustin is acting in a scribe capacity, has observed my performance of the services and has documented them in accordance with my direction.    Isaac Serrato MD  Emergency Medicine  [facility] EMERGENCY ROOM  [address] [facility] [id number]  [phone]     Isaac Serrato MD  02/10/23 2224

## 2023-02-10 NOTE — ED TRIAGE NOTES
Arrives to ED with c/o N/V beginning at 0300. Emesis x10. Noted blood streaking. Denies diarrhea. Abd pain when vomiting. +chills. Has not used marijuana >1 month.      Triage Assessment     Row Name 02/10/23 1530       Triage Assessment (Adult)    Airway WDL WDL       Respiratory WDL    Respiratory WDL WDL       Skin Circulation/Temperature WDL    Skin Circulation/Temperature WDL WDL       Cardiac WDL    Cardiac WDL X;rhythm    Pulse Rate & Regularity tachycardic       Peripheral/Neurovascular WDL    Peripheral Neurovascular WDL WDL       Cognitive/Neuro/Behavioral WDL    Cognitive/Neuro/Behavioral WDL WDL

## 2023-02-11 ENCOUNTER — APPOINTMENT (OUTPATIENT)
Dept: ULTRASOUND IMAGING | Facility: CLINIC | Age: 38
End: 2023-02-11
Payer: COMMERCIAL

## 2023-02-11 VITALS
RESPIRATION RATE: 18 BRPM | BODY MASS INDEX: 30.17 KG/M2 | DIASTOLIC BLOOD PRESSURE: 70 MMHG | WEIGHT: 199.1 LBS | TEMPERATURE: 98.3 F | HEIGHT: 68 IN | HEART RATE: 99 BPM | OXYGEN SATURATION: 98 % | SYSTOLIC BLOOD PRESSURE: 123 MMHG

## 2023-02-11 LAB
ANION GAP SERPL CALCULATED.3IONS-SCNC: 10 MMOL/L (ref 5–18)
BASOPHILS # BLD AUTO: 0 10E3/UL (ref 0–0.2)
BASOPHILS NFR BLD AUTO: 0 %
BUN SERPL-MCNC: 9 MG/DL (ref 8–22)
CALCIUM SERPL-MCNC: 9.2 MG/DL (ref 8.5–10.5)
CHLORIDE BLD-SCNC: 111 MMOL/L (ref 98–107)
CO2 SERPL-SCNC: 18 MMOL/L (ref 22–31)
CREAT SERPL-MCNC: 0.7 MG/DL (ref 0.6–1.1)
EOSINOPHIL # BLD AUTO: 0 10E3/UL (ref 0–0.7)
EOSINOPHIL NFR BLD AUTO: 0 %
ERYTHROCYTE [DISTWIDTH] IN BLOOD BY AUTOMATED COUNT: 17.7 % (ref 10–15)
GFR SERPL CREATININE-BSD FRML MDRD: >90 ML/MIN/1.73M2
GLUCOSE BLD-MCNC: 112 MG/DL (ref 70–125)
HCT VFR BLD AUTO: 28.8 % (ref 35–47)
HGB BLD-MCNC: 8.3 G/DL (ref 11.7–15.7)
HGB BLD-MCNC: 8.6 G/DL (ref 11.7–15.7)
HGB BLD-MCNC: 8.9 G/DL (ref 11.7–15.7)
IMM GRANULOCYTES # BLD: 0.1 10E3/UL
IMM GRANULOCYTES NFR BLD: 0 %
LYMPHOCYTES # BLD AUTO: 1.8 10E3/UL (ref 0.8–5.3)
LYMPHOCYTES NFR BLD AUTO: 9 %
MCH RBC QN AUTO: 22.6 PG (ref 26.5–33)
MCHC RBC AUTO-ENTMCNC: 29.9 G/DL (ref 31.5–36.5)
MCV RBC AUTO: 76 FL (ref 78–100)
MONOCYTES # BLD AUTO: 2 10E3/UL (ref 0–1.3)
MONOCYTES NFR BLD AUTO: 10 %
NEUTROPHILS # BLD AUTO: 16.3 10E3/UL (ref 1.6–8.3)
NEUTROPHILS NFR BLD AUTO: 81 %
NRBC # BLD AUTO: 0 10E3/UL
NRBC BLD AUTO-RTO: 0 /100
PLATELET # BLD AUTO: 389 10E3/UL (ref 150–450)
POTASSIUM BLD-SCNC: 3.6 MMOL/L (ref 3.5–5)
RBC # BLD AUTO: 3.81 10E6/UL (ref 3.8–5.2)
SODIUM SERPL-SCNC: 139 MMOL/L (ref 136–145)
WBC # BLD AUTO: 20.2 10E3/UL (ref 4–11)

## 2023-02-11 PROCEDURE — 85018 HEMOGLOBIN: CPT

## 2023-02-11 PROCEDURE — 258N000003 HC RX IP 258 OP 636: Performed by: STUDENT IN AN ORGANIZED HEALTH CARE EDUCATION/TRAINING PROGRAM

## 2023-02-11 PROCEDURE — 80048 BASIC METABOLIC PNL TOTAL CA: CPT | Performed by: STUDENT IN AN ORGANIZED HEALTH CARE EDUCATION/TRAINING PROGRAM

## 2023-02-11 PROCEDURE — 36415 COLL VENOUS BLD VENIPUNCTURE: CPT | Performed by: STUDENT IN AN ORGANIZED HEALTH CARE EDUCATION/TRAINING PROGRAM

## 2023-02-11 PROCEDURE — 85018 HEMOGLOBIN: CPT | Performed by: STUDENT IN AN ORGANIZED HEALTH CARE EDUCATION/TRAINING PROGRAM

## 2023-02-11 PROCEDURE — 99222 1ST HOSP IP/OBS MODERATE 55: CPT | Mod: AI | Performed by: STUDENT IN AN ORGANIZED HEALTH CARE EDUCATION/TRAINING PROGRAM

## 2023-02-11 PROCEDURE — 76857 US EXAM PELVIC LIMITED: CPT

## 2023-02-11 PROCEDURE — 85025 COMPLETE CBC W/AUTO DIFF WBC: CPT | Performed by: STUDENT IN AN ORGANIZED HEALTH CARE EDUCATION/TRAINING PROGRAM

## 2023-02-11 RX ADMIN — SODIUM CHLORIDE: 9 INJECTION, SOLUTION INTRAVENOUS at 07:53

## 2023-02-11 ASSESSMENT — ACTIVITIES OF DAILY LIVING (ADL)
ADLS_ACUITY_SCORE: 18

## 2023-02-11 NOTE — ED NOTES
2000 pt wanted to go home. OB came to talk to patient. OB had pt sign consent. OB doctor stated to keep the patient for 45 minutes after the shot has been given. Talked to ED provider Ama to ask about discharge. He stated he will not be the provider discharge that it would be OB or inpatient provider. Provider Ama requested to talk to OB doctor. Informed the [facility] to do a call. At this time still waiting on a plan for discharge, but pt agreed to do the medication per MAR/signed consent.

## 2023-02-11 NOTE — DISCHARGE INSTRUCTIONS
As OB recommended follow-up with their clinic on Monday.  If over the weekend any worsening abdominal pain, heavy vaginal bleeding, fever, lightheadedness, or dizziness please return to the ER immediately.  You can use Zofran every 8 hours as needed for nausea.

## 2023-02-11 NOTE — DISCHARGE SUMMARY
[facility]  Discharge Summary - Medicine & Pediatrics       Date of Admission:  2/10/2023  Date of Discharge:  2/11/2023  Discharging Provider: Dr. Rafael Partida  Discharge Service: Hospitalist Service    Discharge Diagnoses   Ectopic Pregnancy    Follow-ups Needed After Discharge   Follow-up Appointments     Follow-up and recommended labs and tests       Follow-up per OB instructions            Per OB  1. Follow up with your doctor's clinic per discharge instructions.   2. Discontinue any vitamins and non-steroidal medications (check with your provider at the next clinic visit when to resume)   3. Do not consume alcohol (check with your provider at the next clinic visit when to resume)   4. Avoid intercourse (check with your provider at the next clinic visit when to resume)   5. Avoid vigorous physical activity (check with your provider at the next clinic visit when to resume)   6. Contact doctor's clinic if experiencing severe abdominal pain, chest pain, weakness, dizziness or fainting after Methotrexate is given.      Discharge Disposition   Discharged to home  Condition at discharge: Stable      Hospital Course   Sheridan Hollingsworth was admitted on 2/10/2023 for nausea, vomiting, and epigastric pain. Left adnexal ectopic pregnancy at approximately 6-week 3-day gestation was noted by a transvaginal ultrasound.  Mean gestational sac diameter of 1.2 cm was identified.  Patient received methotrexate and RhoGAM. Patient noted irregular menstrual cycles in the past. Initial hemoglobin was 10.4, subsequent hemoglobin was noted to be reduced to 8.3.  Further repeat hemoglobin stabilized at 8.9. Due to drop in hemoglobin level, repeat pelvic ultrasound was ordered which showed no hemoperitoneum. Given patient's stable hemoglobin and the fact that the patient remained hemodynamically stable, patient was discharged with recommended follow-up with OB as above.      Consultations This Hospital Stay   OB GYN  IP CONSULT    Code Status   Full Code       The patient was discussed with Dr. Rafael Christianson,   [address] [address]  Phone: [phone]  Fax: [phone]  ______________________________________________________________________    Physical Exam   Vital Signs: Temp: 98.3  F (36.8  C) Temp src: Oral BP: 123/70 Pulse: 99   Resp: 18 SpO2: 98 % O2 Device: None (Room air)    Weight: 199 lbs 1.6 oz  Constitutional: awake, alert, cooperative, no apparent distress, and appears stated age  Eyes: Lids and lashes normal, pupils equal, round, extra ocular muscles intact, sclera clear, conjunctiva normal  ENT: Normocephalic, without obvious abnormality, atraumatic, external ears without lesions, oral pharynx with moist mucous membranes, gums normal and good dentition.  Hematologic / Lymphatic: no cervical lymphadenopathy  Respiratory: No increased work of breathing, good air exchange, clear to auscultation bilaterally, no crackles or wheezing  Cardiovascular: Normal apical impulse, regular rate and rhythm, normal S1 and S2, and no murmur noted  GI: No scars, normal bowel sounds, soft, non-distended, non-tender, no masses palpated, no hepatosplenomegally  Skin: normal skin color, texture, turgor  Musculoskeletal: There is no redness, warmth, or swelling of the joints.  Full range of motion noted.  Motor strength is 5 out of 5 all extremities bilaterally.  Tone is normal.  Neurologic: Awake, alert, oriented to name, place and time.  Cranial nerves II-XII are grossly intact.  Motor is 5 out of 5 bilaterally.   Neuropsychiatric: General: normal, calm and normal eye contact      Primary Care Physician   Physician No Ref-Primary    Discharge Orders      Discharge Instructions    1. Follow up with your doctor's clinic per discharge instructions.   2. Discontinue any vitamins and non-steroidal medications (check with your  provider at the next clinic visit when to resume)  3. Do not consume alcohol (check with your provider at the next clinic visit when to resume)  4. Avoid intercourse (check with your provider at the next clinic visit when to resume)  5. Avoid vigorous physical activity (check with your provider at the next clinic visit when to resume)  6. Contact doctor's clinic if experiencing severe abdominal pain, chest pain, weakness, dizziness or fainting after Methotrexate is given.     Reason for your hospital stay    Ectopic pregnancy     Follow-up and recommended labs and tests     Follow-up per OB instructions     Activity    Your activity upon discharge: activity as tolerated     Diet    Follow this diet upon discharge: Regular       Significant Results and Procedures   Results for orders placed or performed during the hospital encounter of 02/10/23   OB  US 1st trimester w transvag     Value    Radiologist flags Positive ectopic pregnancy (AA)    Narrative    EXAM: US OB <14 WEEKS WITH TRANSVAGINAL SINGLE  LOCATION: [facility]  DATE/TIME: 2/10/2023 6:04 PM    INDICATION: Vomiting. Abdominal pain. Positive pregnancy test.  COMPARISON: Ultrasound 06/04/2022.  TECHNIQUE: Transabdominal scans were performed. Endovaginal ultrasound was performed to better visualize the embryo.    FINDINGS:    UTERUS: No intrauterine gestational sac, yolk sac, or embryo is identified. Endometrium is homogenous and measures up to 1 cm in thickness. Multiple cervical nabothian cysts.    RIGHT OVARY: Normal. Measures 2.6 x 1.1 x 1.5 cm.    LEFT OVARY: Normal. Measures 2.8 x 1.9 x 1.4 cm and contains a small corpus luteal cyst.    A left adnexal ectopic pregnancy with a mean gestational sac diameter of 1.2 cm is identified with parameters as follows:    CRL: Measures 0.6 cm, equals 6 weeks 3 days.  FETAL HEART RATE: 120 bpm.  AMNIOTIC FLUID: Normal.    No significant free pelvic fluid or other signs of rupture.       Impression    IMPRESSION:     1.  Single living left adnexal ectopic pregnancy at 6 weeks 3 days gestation.    2.  No significant free pelvic fluid or other signs of rupture.    [Critical Result: Positive ectopic pregnancy]    Finding was identified on 2/10/2023 6:11 PM.     Dr. Serrato was contacted by me on 2/10/2023 6:13 PM and verbalized understanding of the critical result.   US Pelvic Limited    Narrative    EXAM: US PELVIC LIMITED  LOCATION: [facility]  DATE/TIME: 2/11/2023 12:47 PM    INDICATION: Evaluate for pneumoperitoneum.  COMPARISON: Pelvic ultrasound 02/10/2023.  TECHNIQUE: Transabdominal scans were performed. Endovaginal ultrasound was performed to better visualize the adnexa.    FINDINGS:    Focused ultrasound was performed to evaluate for hemoperitoneum.    There is a trace amount of simple free pelvic fluid in the cul-de-sac, not significant changed since yesterday's exam. No hemoperitoneum.    Left adnexal ectopic pregnancy appears similar but was not directly interrogated.    Appearance of the uterus with cervical nabothian cysts is unchanged.      Impression    IMPRESSION:    1. No hemoperitoneum.    2. Left adnexal ectopic pregnancy appears similar but was not directly interrogated.       Discharge Medications   Current Discharge Medication List      START taking these medications    Details   ondansetron (ZOFRAN ODT) 4 MG ODT tab Take 1 tablet (4 mg) by mouth every 8 hours as needed for nausea  Qty: 20 tablet, Refills: 0      prochlorperazine (COMPAZINE) 10 MG tablet Take 1 tablet (10 mg) by mouth every 6 hours as needed for nausea or vomiting  Qty: 30 tablet, Refills: 0           Allergies   Allergies   Allergen Reactions     Nkda [No Known Drug Allergies]

## 2023-02-11 NOTE — CARE PLAN
Discharge paperwork and teaching with patient has been completed. RN gave adequate time to answer questions and patient verbally stated they understand information.    Татьяна Mahoney RN

## 2023-02-11 NOTE — H&P
"    [facility]    History and Physical - Hospitalist Service       Date of Admission:  2/10/2023    Assessment & Plan      Sheridan Hollingsworth is a 37 year old female admitted on 2/10/2023. She has a history of cannabis use, GERD and cholecystectomy and is admitted for ectopic pregnancy    Left tubal pregnancy without intrauterine pregnancy  Hyperemesis gravidarum   with symptoms of vomiting with epigastric pain.  No use history of cannabis recently.  Sexually active, not on birth control.  Previous history of a portion with D&C when she was 19 years old.  Single living left adnexal ectopic pregnancy at 6 weeks 3 days gestation.  UA is positive for ketones but negative for nitrate and leukocyte esterase.  Unremarkable lipase and BMP.  CBC is positive for leukocytosis, 21.2, most likely stress response.  Positive hCG.  Trans vaginal ultrasound revealed no significant abnormality.  Ultrasound OB less than 14 weeks revealed single living left adnexal ectopic pregnancy at 6 weeks 3 days gestation and no significant free pelvic fluid or other signs of rupture.     -IV fluid  -Zofran for nausea  -Dilaudid for pain, as needed  -N.p.o.  -OB consult  -CBC a.m.  -BMP a.m.    Diet: NPO per Anesthesia Guidelines for Procedure/Surgery Except for: Meds   DVT Prophylaxis: Pneumatic Compression Devices  Ortiz Catheter: Not present  Fluids: IV fluid  Lines: None     Cardiac Monitoring: None  Code Status: Full Code    Clinically Significant Risk Factors Present on Admission           # Hypercalcemia: Highest Ca = 10.2 mg/dL in last 2 days, will monitor as appropriate             # Obesity: Estimated body mass index is 30.27 kg/m  as calculated from the following:    Height as of this encounter: 1.727 m (5' 8\").    Weight as of this encounter: 90.3 kg (199 lb 1.6 oz).           Disposition Plan      Expected Discharge Date: 2023                The patient's care was discussed with the Attending Physician, " Dr. Partida.      Jose Bearden MD  Hospitalist Service  [facility]  Securely message with [employer] (more info)  Text page via [facility] Paging/Directory   ______________________________________________________________________    Chief Complaint   Vomiting and abdominal pain    History is obtained from the patient    History of Present Illness   Sheridan Hollingsworth is a 37 year old female who has a history of cannabis use, GERD and cholecystectomy  and is admitted for ectopic pregnancy.    Patient complain of continuous vomiting in the day of admission, more than 10 episodes of emesis.  The vomiting associated with streak of blood towards the end which was stopped.  Also endorses upper abdominal pain, 5-6/10 thus worse with vomiting.  Patient used to use marijuana, last time was 3 months ago due to cyclic vomiting while using it.  Endorses secondhand marijuana usage.  Initially patient thought the vomiting due to food poisoning and took capsaicin which did not help.  Also endorses some vaginal spotting, but her menstrual period is irregular.  Denies recent travel, fever, diarrhea or constipation.    ED course-patient received famotidine 20 mg 2 times, Haldol 2 mg, IV fluid 2 L, NS, and Zofran.  Labs and ultrasound were completed.  Consulted OB who recommended admission    Past Medical History    Past Medical History:   Diagnosis Date     Gastroesophageal reflux disease        Past Surgical History   Past Surgical History:   Procedure Laterality Date     CHOLECYSTECTOMY         Prior to Admission Medications   None        Review of Systems    Gastrointestinal: Positive for abdominal pain, nausea and vomiting (with blood). Negative for diarrhea.   Genitourinary: Positive for vaginal bleeding (spotting).   All other systems reviewed and are negative.  Physical Exam   Vital Signs: Temp: 97  F (36.1  C) Temp src: Temporal BP: (!) 143/89 Pulse: 110   Resp: 20 SpO2: 98 % O2 Device: None (Room air)    Weight:  199 lbs 1.6 oz    Constitutional: awake, alert, cooperative, no apparent distress, and appears stated age  Eyes: Lids and lashes normal, pupils equal, round and reactive to light, extra ocular muscles intact, sclera clear, conjunctiva normal  ENT: Normocephalic, without obvious abnormality, atraumatic, sinuses nontender on palpation, external ears without lesions, oral pharynx with moist mucous membranes, tonsils without erythema or exudates, gums normal and good dentition.  Hematologic / Lymphatic: no cervical lymphadenopathy  Respiratory: No increased work of breathing, good air exchange, clear to auscultation bilaterally, no crackles or wheezing  Cardiovascular: Normal apical impulse, regular rate and rhythm, normal S1 and S2, no S3 or S4, and no murmur noted  GI: No scars, normal bowel sounds, tenderness on the epigastric, no masses palpated, no hepatosplenomegally  Skin: no bruising or bleeding  Neurologic: Awake, alert, oriented to name, place and time.  Cranial nerves II-XII are grossly intact.   Neuropsychiatric: General: normal, calm and normal eye contact  Level of consciousness: alert / normal    Medical Decision Making     **CLEAR ALL SELECTIONS**      Data     I have personally reviewed the following data over the past 24 hrs:    21.2 (H)  \   10.4 (L)   / 546 (H)     141 109 (H) 12 /  200 (H)   4.2 18 (L) 1.03 \       ALT: 14 AST: 13 AP: 72 TBILI: 0.6   ALB: 4.6 TOT PROTEIN: 8.5 (H) LIPASE: 23       Imaging results reviewed over the past 24 hrs:   Recent Results (from the past 24 hour(s))   OB  US 1st trimester w transvag   Result Value    Radiologist flags Positive ectopic pregnancy (AA)    Narrative    EXAM: US OB <14 WEEKS WITH TRANSVAGINAL SINGLE  LOCATION: [facility]  DATE/TIME: 2/10/2023 6:04 PM    INDICATION: Vomiting. Abdominal pain. Positive pregnancy test.  COMPARISON: Ultrasound 06/04/2022.  TECHNIQUE: Transabdominal scans were performed. Endovaginal ultrasound was  performed to better visualize the embryo.    FINDINGS:    UTERUS: No intrauterine gestational sac, yolk sac, or embryo is identified. Endometrium is homogenous and measures up to 1 cm in thickness. Multiple cervical nabothian cysts.    RIGHT OVARY: Normal. Measures 2.6 x 1.1 x 1.5 cm.    LEFT OVARY: Normal. Measures 2.8 x 1.9 x 1.4 cm and contains a small corpus luteal cyst.    A left adnexal ectopic pregnancy with a mean gestational sac diameter of 1.2 cm is identified with parameters as follows:    CRL: Measures 0.6 cm, equals 6 weeks 3 days.  FETAL HEART RATE: 120 bpm.  AMNIOTIC FLUID: Normal.    No significant free pelvic fluid or other signs of rupture.      Impression    IMPRESSION:     1.  Single living left adnexal ectopic pregnancy at 6 weeks 3 days gestation.    2.  No significant free pelvic fluid or other signs of rupture.    [Critical Result: Positive ectopic pregnancy]    Finding was identified on 2/10/2023 6:11 PM.     Dr. Serrato was contacted by me on 2/10/2023 6:13 PM and verbalized understanding of the critical result.

## 2023-02-11 NOTE — PROGRESS NOTES
Pt reported some increase in bleeding this AM. Pt stated she has not been wearing a pad this shift but would like to have one now. She stated she has been spotting at home since prior to finding out about recent ectopic pregnancy during hospitalization. Pads given to patient and RN educated patient to call staff if she is soaking a pad in an hour or any significant bleeding. RN did discuss with Dr. Larios about patient's current hgb of 8.6 which dropped from 10.4 last night. Dr. Larios stated to make her NPO, RN updated patient with this new information and patient was OK with this.    Ida Crespo RN  2/11/2023  7:01 AM

## 2023-02-11 NOTE — PLAN OF CARE
"  Problem: Ectopic Pregnancy  Goal: Absence of Bleeding  Outcome: Progressing   Pt has denied any bleeding this shift.      Problem: Ectopic Pregnancy  Goal: Acceptable Pain Control  Outcome: Progressing   Pt has denied any pain but has taken 3 tub baths this shift. Has not requested any PRN medications for pain.      Problem: Ectopic Pregnancy  Goal: Fluid and Electrolyte Balance  Outcome: Progressing   Pt has been eating and drinking, has not had any emesis per her report stating she was \"feeling much better\". IVF have been stopped since pt is allowed to eat/drink. Restart IVF if patient begins vomiting again per NOC MD resident.    Ida Crespo RN  2/11/2023  6:34 AM    "

## 2023-02-11 NOTE — PHARMACY-ADMISSION MEDICATION HISTORY
Pharmacy Note - Admission Medication History    Pertinent Provider Information: No meds     ______________________________________________________________________    Prior To Admission (PTA) med list completed and updated in EMR.       No outpatient medications have been marked as taking for the 2/10/23 encounter (Hospital Encounter).       Information source(s): Patient and CareEverywhere/SureScripts  Method of interview communication: in-person    Summary of Changes to PTA Med List  New: N/A  Discontinued: Advil  Changed: N/A    Patient was asked about OTC/herbal products specifically.  PTA med list reflects this.    Medication Affordability:  Not including over the counter (OTC) medications, was there a time in the past 12 months when you did not take your medications as prescribed because of cost?: No    Allergies were reviewed, assessed, and updated with the patient.      Patient does not use any multi-dose medications prior to admission.    The information provided in this note is only as accurate as the sources available at the time of the update(s).    Thank you for the opportunity to participate in the care of this patient.    Peter Reed [facility]  2/10/2023 6:38 PM

## 2023-02-11 NOTE — ED NOTES
Pt alert and orientated x3. Tolerated medications well, but zofran has not been working. Offered pt another dose, and she declined. Pt ambulates independently, pain comes and goes in lower abdominal pain. Pt agrees to stay inpatient as long as she is able to get a bath/shower. Informed receiving registered nurse, and she will set her up. Pt has no concerns at this time.

## 2023-02-11 NOTE — CONSULTS
[facility] Consultation by OBGYN    Sheridan Hollingsworth MRN# [id number]   Age: 37 year old YOB: 1985     Date of Admission:  2/10/2023    Reason for consult: Ectopic pregnancy        Requesting physician: hospitalist        Level of consult: Consult, follow and place orders           Assessment and Plan:   Assessment:   Ectopic pregnancy       Plan:   Risks and benefits were discussed with patient and she decided to proceed iwht methotrexate.  She understands she will need to follow up next week to insure medication has resolved pregnancy     methotrexate to be given before discharge     Call [facility]  [phone] for follow up              Chief Complaint:   Nausea and vomiting       History is obtained from the patient         History of Present Illness:   This patient is a 37 year old  female who presents with nausea and vomiting.  She was unsure of past lmp . Did not know she was pregnant.  Had US which showed unruptred left ectopic pregnancy.  (see imagine for complete report.)              Past Medical History:     Past Medical History:   Diagnosis Date     Gastroesophageal reflux disease              Past Surgical History:             Social History:     Social History     Tobacco Use     Smoking status: Some Days     Types: Cigarettes     Smokeless tobacco: Current   Substance Use Topics     Alcohol use: Not on file             Family History:   No family history on file.  Family history           Immunizations:             Allergies:   This patient is allergic to is allergic to nkda [no known drug allergies].          Medications:     Current Facility-Administered Medications   Medication     HYDROmorphone (PF) (DILAUDID) injection 0.2 mg     lidocaine (LMX4) cream     lidocaine 1 % 0.1-1 mL     melatonin tablet 1 mg     methotrexate sodium (pres-free) injection 104 mg     ondansetron (ZOFRAN ODT) ODT tab 4 mg    Or     ondansetron (ZOFRAN) injection 4 mg      prochlorperazine (COMPAZINE) injection 10 mg    Or     prochlorperazine (COMPAZINE) tablet 10 mg    Or     prochlorperazine (COMPAZINE) suppository 25 mg     rho(D) immune globulin (RHOPHYLAC) injection 300 mcg    Or     rho(D) immune globulin (RHOPHYLAC) injection 300 mcg     sodium chloride (PF) 0.9% PF flush 3 mL     sodium chloride (PF) 0.9% PF flush 3 mL     sodium chloride 0.9% infusion     No current outpatient medications on file.             Review of Systems:   The Review of Systems is negative other than noted in the HPI     In no acute distress   abd- soft , nontender              Data:   All laboratory data reviewed  Ultrasound impression reviewed       Attestation:  I have reviewed today's vital signs, notes, medications, labs and imaging.  Amount of time performed on this consult: 30 minutes.    Julianna Larios MD

## 2023-02-14 ENCOUNTER — PATIENT OUTREACH (OUTPATIENT)
Dept: CARE COORDINATION | Facility: CLINIC | Age: 38
End: 2023-02-14
Payer: COMMERCIAL

## 2023-02-14 NOTE — PROGRESS NOTES
Clinic Care Coordination Contact  [facility]/Voicemail       Clinical Data: Care Coordinator Outreach  Outreach attempted x 2.  Left message on patient's voicemail with call back information and requested return call.  Care Coordinator will do no further outreaches at this time.    April Acosta  [phone]  Care

## 2023-05-14 ENCOUNTER — HEALTH MAINTENANCE LETTER (OUTPATIENT)
Age: 38
End: 2023-05-14

## 2024-07-21 ENCOUNTER — HEALTH MAINTENANCE LETTER (OUTPATIENT)
Age: 39
End: 2024-07-21

## 2024-09-29 ENCOUNTER — HEALTH MAINTENANCE LETTER (OUTPATIENT)
Age: 39
End: 2024-09-29

## 2025-08-10 ENCOUNTER — HEALTH MAINTENANCE LETTER (OUTPATIENT)
Age: 40
End: 2025-08-10